# Patient Record
Sex: FEMALE | Race: WHITE
[De-identification: names, ages, dates, MRNs, and addresses within clinical notes are randomized per-mention and may not be internally consistent; named-entity substitution may affect disease eponyms.]

---

## 2018-05-24 ENCOUNTER — HOSPITAL ENCOUNTER (EMERGENCY)
Dept: HOSPITAL 25 - UCCORT | Age: 58
Discharge: HOME | End: 2018-05-24
Payer: COMMERCIAL

## 2018-05-24 VITALS — DIASTOLIC BLOOD PRESSURE: 92 MMHG | SYSTOLIC BLOOD PRESSURE: 195 MMHG

## 2018-05-24 DIAGNOSIS — H81.10: Primary | ICD-10-CM

## 2018-05-24 DIAGNOSIS — F17.210: ICD-10-CM

## 2018-05-24 DIAGNOSIS — Z88.5: ICD-10-CM

## 2018-05-24 DIAGNOSIS — F41.9: ICD-10-CM

## 2018-05-24 DIAGNOSIS — I10: ICD-10-CM

## 2018-05-24 LAB
BASOPHILS # BLD AUTO: 0.1 10^3/UL (ref 0–0.2)
EOSINOPHIL # BLD AUTO: 0.2 10^3/UL (ref 0–0.6)
HCT VFR BLD AUTO: 41 % (ref 35–47)
HGB BLD-MCNC: 14 G/DL (ref 12–16)
LYMPHOCYTES # BLD AUTO: 4.5 10^3/UL (ref 1–4.8)
MCH RBC QN AUTO: 31 PG (ref 27–31)
MCHC RBC AUTO-ENTMCNC: 34 G/DL (ref 31–36)
MCV RBC AUTO: 91 FL (ref 80–97)
MONOCYTES # BLD AUTO: 0.9 10^3/UL (ref 0–0.8)
NEUTROPHILS # BLD AUTO: 6.1 10^3/UL (ref 1.5–7.7)
NRBC # BLD AUTO: 0 10^3/UL
NRBC BLD QL AUTO: 0
PLATELET # BLD AUTO: 364 10^3/UL (ref 150–450)
RBC # BLD AUTO: 4.51 10^6/UL (ref 4–5.4)
WBC # BLD AUTO: 11.7 10^3/UL (ref 3.5–10.8)

## 2018-05-24 PROCEDURE — 99202 OFFICE O/P NEW SF 15 MIN: CPT

## 2018-05-24 PROCEDURE — 85025 COMPLETE CBC W/AUTO DIFF WBC: CPT

## 2018-05-24 PROCEDURE — 93005 ELECTROCARDIOGRAM TRACING: CPT

## 2018-05-24 PROCEDURE — 84443 ASSAY THYROID STIM HORMONE: CPT

## 2018-05-24 PROCEDURE — 80053 COMPREHEN METABOLIC PANEL: CPT

## 2018-05-24 PROCEDURE — G0463 HOSPITAL OUTPT CLINIC VISIT: HCPCS

## 2018-05-24 PROCEDURE — 36415 COLL VENOUS BLD VENIPUNCTURE: CPT

## 2018-05-24 NOTE — UC
Dizzy HPI


HPI Summary: 





Intermittent dizziness x 2 weeks. First occurred 2 weeks ago, had dizziness and 

sense of room moving x 1/2 hour after bending forward at work. Recurred 3 days 

ago when she turned towards her granddaughter, had sense of room moving. Has 

eased off, but for the past 2 days has had intermittent sense of lightheadness, 

sometimes with sense of room shifting. 


No headache, diplopia, vision loss or scotomoa. 


No associated nausea or vomiting. No sense of near fainging. 


Notable is very high blood pressure on assessment: 195/92 initially, recheck 

lying 178/82, standing systolic 202. 


No chest pain, shortness of breath. 


Long term smoker. 


Admits to anxiety over the long term, no treatment. 


Last medical check up was about 7 years ago. 





- History Of Current Complaint


Chief Complaint: UCDizziness


Stated Complaint: DIZZINESS


Time Seen by Provider: 18 14:35


Hx Obtained From: Patient


Pregnant?: No


Onset/Duration: Gradual Onset, Lasting Days - 3


Timing: Intermittent Episode Lasting - hours off and on


Severity Initially: Moderate


Severity Currently: Moderate


Pain Intensity: 0


Character: Room Spinning, Lightheaded


Aggravating Factor(s): Position Change, Change In Head Position


Alleviating Factor(s): Rest


Associated Signs And Symptoms: Positive: Tinnitus - in left ear, associates 

with some hearing loss.





- Risk Factors


Cardiac Risk Factors: Hypertension, Smoking


CVA Risk Factor: Hypertension, Smoking





- Allergies/Home Medications


Allergies/Adverse Reactions: 


 Allergies











Allergy/AdvReac Type Severity Reaction Status Date / Time


 


oxycodone Allergy  See Comment Verified 18 13:58














PMH/Surg Hx/FS Hx/Imm Hx





- Additional Past Medical History


Additional PMH: 





history of gestational diabetes


Previously Healthy: Yes - Smoker 1/2 ppd x almost 40 years


Psychological History: Anxiety





- Surgical History


Surgical History: None





- Family History


Known Family History: Positive: Hypertension, Diabetes, Other - father  

brain tumor





- Social History


Occupation: Employed Full-time


Lives: With Family - fiancee


Alcohol Use: Occasionally


Substance Use Type: None


Smoking Status (MU): Heavy Every Day Tobacco Smoker


Type: Cigarettes


Amount Used/How Often: 1 1/2 PPD





Review of Systems


Constitutional: Negative


ENT: Nasal Discharge, Sinus Congestion, Other - recent


Respiratory: Cough


Psychological: Anxious


Is Patient Immunocompromised?: No


All Other Systems Reviewed And Are Negative: Yes





Physical Exam


Triage Information Reviewed: Yes


Appearance: Well-Appearing, Thin - anxious.


Vital Signs: 


 Initial Vital Signs











Temp  99.7 F   18 13:58


 


Pulse  108   18 13:58


 


Resp  18   18 13:58


 


BP  195/92   18 13:58


 


Pulse Ox  97   18 13:58











Eye Exam: Other - MARY ALICE, normal EOM, no nystagmus, normal visual fields by 

confrontation.


Eyes: Positive: Conjunctiva Clear


ENT: Positive: Pharynx normal


Dental Exam: Other - upper denture


Neck: Positive: Supple, Nontender, No Lymphadenopathy


Respiratory: Positive: Lungs clear, Normal breath sounds


Cardiovascular: Positive: RRR, No Murmur


Abdomen Description: Positive: Nontender, No Organomegaly, Soft.  Negative: 

Bruit


Musculoskeletal Exam: Normal


Musculoskeletal: Positive: Strength Intact, ROM Intact


Neurological Exam: Other - CNII-XII normal; DTR's 2+


Neurological: Positive: Alert, Muscle Tone Normal


Psychological Exam: Normal - anxious, but mood is normal.


Skin Exam: Normal





Diagnostics





- EKG


Cardiac Rate: NL


Cardiac Rhythm: Sinus: Normal


Ectopy: None


ST Segment: Normal





Dizzy Course/Dx





- Course


Course Of Treatment: begin metoprolol 25mg once daily for treatment of 

hypertension.  Use meclizine at night to ease dizziness.  Follow up blood 

pressure needed in 4 days.





- Differential Dx/Diagnosis


Provider Diagnoses: positional vertigo.  severe hypertension





Discharge





- Sign-Out/Discharge


Documenting (check all that apply): Post-Discharge Follow Up





- Discharge Plan


Condition: Stable


Disposition: HOME


Prescriptions: 


Meclizine TAB* [Antivert 12.5 TAB*] 12.5 mg PO TID PRN #30 tab


 PRN Reason: Dizziness


Metoprolol Succinate XL TAB* [Toprol XL TAB*] 25 mg PO DAILY #30 tab.xl


Patient Education Materials:  Hypertension (ED), Vertigo (ED)


Forms:  *Work Release


Referrals: 


Karlo Sosa MD [Primary Care Provider] - 


Maritza Dickson MD [Emergency Provider] - 


Additional Instructions: 


Please confirm appointment with my office, [facility], [address] [place] [address] on Tuesday the 29th of May at 9:40. Number is [phone]


Begin metoprolol 25mg daily for control of blood pressure. 


Ensure high intake of fluids. 


Use meclizine for dizziness/vertigo up to 3 times daily AS NEEDED. It can cause 

sedation. 


Make efforts to stop smoking. 


Lab work has been done to screen your thyroid, liver and kidney functions. 


Off work tomorrow. 





- Billing Disposition and Condition


Condition: STABLE


Disposition: HOME